# Patient Record
Sex: FEMALE | Race: WHITE
[De-identification: names, ages, dates, MRNs, and addresses within clinical notes are randomized per-mention and may not be internally consistent; named-entity substitution may affect disease eponyms.]

---

## 2021-09-27 ENCOUNTER — HOSPITAL ENCOUNTER (OUTPATIENT)
Dept: HOSPITAL 75 - CSD | Age: 61
Setting detail: OBSERVATION
LOS: 2 days | Discharge: HOME | End: 2021-09-29
Attending: INTERNAL MEDICINE | Admitting: INTERNAL MEDICINE
Payer: COMMERCIAL

## 2021-09-27 VITALS — DIASTOLIC BLOOD PRESSURE: 68 MMHG | SYSTOLIC BLOOD PRESSURE: 131 MMHG

## 2021-09-27 VITALS — HEIGHT: 65.75 IN | BODY MASS INDEX: 47.65 KG/M2 | WEIGHT: 293 LBS

## 2021-09-27 VITALS — DIASTOLIC BLOOD PRESSURE: 88 MMHG | SYSTOLIC BLOOD PRESSURE: 132 MMHG

## 2021-09-27 VITALS — DIASTOLIC BLOOD PRESSURE: 83 MMHG | SYSTOLIC BLOOD PRESSURE: 138 MMHG

## 2021-09-27 VITALS — SYSTOLIC BLOOD PRESSURE: 102 MMHG | DIASTOLIC BLOOD PRESSURE: 62 MMHG

## 2021-09-27 VITALS — DIASTOLIC BLOOD PRESSURE: 75 MMHG | SYSTOLIC BLOOD PRESSURE: 108 MMHG

## 2021-09-27 DIAGNOSIS — I25.110: ICD-10-CM

## 2021-09-27 DIAGNOSIS — I21.4: ICD-10-CM

## 2021-09-27 DIAGNOSIS — Z79.1: ICD-10-CM

## 2021-09-27 DIAGNOSIS — Z79.890: ICD-10-CM

## 2021-09-27 DIAGNOSIS — M71.21: Primary | ICD-10-CM

## 2021-09-27 DIAGNOSIS — Z79.4: ICD-10-CM

## 2021-09-27 DIAGNOSIS — I34.0: ICD-10-CM

## 2021-09-27 DIAGNOSIS — E03.9: ICD-10-CM

## 2021-09-27 DIAGNOSIS — I25.2: ICD-10-CM

## 2021-09-27 DIAGNOSIS — E66.01: ICD-10-CM

## 2021-09-27 DIAGNOSIS — R79.1: ICD-10-CM

## 2021-09-27 DIAGNOSIS — Z79.899: ICD-10-CM

## 2021-09-27 DIAGNOSIS — I11.9: ICD-10-CM

## 2021-09-27 DIAGNOSIS — E11.8: ICD-10-CM

## 2021-09-27 DIAGNOSIS — E78.2: ICD-10-CM

## 2021-09-27 DIAGNOSIS — M79.89: ICD-10-CM

## 2021-09-27 DIAGNOSIS — I87.8: ICD-10-CM

## 2021-09-27 LAB
BUN/CREAT SERPL: 17
CALCIUM SERPL-MCNC: 8.7 MG/DL (ref 8.5–10.1)
CHLORIDE SERPL-SCNC: 104 MMOL/L (ref 98–107)
CO2 SERPL-SCNC: 24 MMOL/L (ref 21–32)
CREAT SERPL-MCNC: 0.84 MG/DL (ref 0.6–1.3)
GFR SERPLBLD BASED ON 1.73 SQ M-ARVRAT: 68 ML/MIN
GLUCOSE SERPL-MCNC: 102 MG/DL (ref 70–105)
POTASSIUM SERPL-SCNC: 4.3 MMOL/L (ref 3.6–5)
SODIUM SERPL-SCNC: 138 MMOL/L (ref 135–145)

## 2021-09-27 PROCEDURE — 82947 ASSAY GLUCOSE BLOOD QUANT: CPT

## 2021-09-27 PROCEDURE — 84484 ASSAY OF TROPONIN QUANT: CPT

## 2021-09-27 PROCEDURE — 93005 ELECTROCARDIOGRAM TRACING: CPT

## 2021-09-27 PROCEDURE — 93306 TTE W/DOPPLER COMPLETE: CPT

## 2021-09-27 PROCEDURE — 85027 COMPLETE CBC AUTOMATED: CPT

## 2021-09-27 PROCEDURE — 85025 COMPLETE CBC W/AUTO DIFF WBC: CPT

## 2021-09-27 PROCEDURE — 80061 LIPID PANEL: CPT

## 2021-09-27 PROCEDURE — 36415 COLL VENOUS BLD VENIPUNCTURE: CPT

## 2021-09-27 PROCEDURE — 93458 L HRT ARTERY/VENTRICLE ANGIO: CPT

## 2021-09-27 PROCEDURE — 85347 COAGULATION TIME ACTIVATED: CPT

## 2021-09-27 PROCEDURE — 80048 BASIC METABOLIC PNL TOTAL CA: CPT

## 2021-09-27 PROCEDURE — 71275 CT ANGIOGRAPHY CHEST: CPT

## 2021-09-27 RX ADMIN — INSULIN ASPART SCH UNIT: 100 INJECTION, SOLUTION INTRAVENOUS; SUBCUTANEOUS at 20:28

## 2021-09-27 RX ADMIN — INSULIN ASPART SCH UNIT: 100 INJECTION, SOLUTION INTRAVENOUS; SUBCUTANEOUS at 18:05

## 2021-09-27 RX ADMIN — ENOXAPARIN SODIUM SCH MG: 300 INJECTION INTRAVENOUS; SUBCUTANEOUS at 20:58

## 2021-09-27 RX ADMIN — DOCUSATE SODIUM SCH MG: 100 CAPSULE ORAL at 20:12

## 2021-09-27 RX ADMIN — SENNOSIDES SCH MG: 8.6 TABLET, FILM COATED ORAL at 20:12

## 2021-09-27 NOTE — DIAGNOSTIC IMAGING REPORT
PROCEDURE: CT angiography of the chest with contrast.



TECHNIQUE: Multiple contiguous axial images were obtained through

the chest after uneventful bolus administration of intravenous

contrast. 3D reconstructed CTA MIP acquisitions were also

performed. Auto Exposure Controls were utilized during the CT

exam to meet ALARA standards for radiation dose reduction.



INDICATION: Elevated D-dimer. Abnormal EKG. History of uterine

cancer.



COMPARISON: None.



FINDINGS: There is no acute pulmonary embolus to the first

subsegmental division of the pulmonary arteries. Pulmonary

arterial trunk measures 3.4 cm in diameter.



There is advanced calcified coronary atherosclerosis. Mild

scattered calcified aortic atherosclerosis is also noted.

Thoracic aorta is suboptimally opacified, but by NASCET criteria,

there is no focal significant stenosis. Additionally, there is no

evidence of dissection or aneurysm.



Heart size is within normal limits. There is a small pericardial

effusion, but this is likely within physiologic limits. Bulky

calcifications of the mitral valve are also noted. No

pathologically enlarged or morphologically abnormal adenopathy is

seen within the mediastinum, lexii, nor axillae.



Evaluation of the lung fields demonstrates no focal

consolidation, large effusion, nor pneumothorax. No suspicious

pulmonary nodule or mass is seen.



Osseous structures show age-related degenerative changes. No

acute abnormality is seen. Included portions of the upper abdomen

show reflux of contrast into the IVC and hepatic veins.



IMPRESSION:

1. No acute pulmonary embolus to the first subsegmental division

of the pulmonary arteries.

2. Advanced calcified coronary atherosclerosis.

3. Mild prominence of the main pulmonary arterial trunk. Findings

can be seen with underlying pulmonary arterial hypertension.

4. Reflux of contrast into the hepatic veins and IVC. This does

raise suspicion for elevated right heart pressure, potentially

related to underlying pulmonary arterial hypertension. Other

right heart dysfunction may result in these findings as well.



Dictated by: 



  Dictated on workstation # [id number]

## 2021-09-27 NOTE — CONSULTATION-CARDIOLOGY
HPI-Cardiology


Cardiology Consultation:


Date of Consultation


09/27/2021


Date of Admission


09/27/2021


Attending Physician


Charley Sylvester MD


Admitting Physician


No,Local Physician


Consulting Physician


LISA MONGE JR, MD





HPI:


Time Seen by a Provider:  17:10


Chief Complaint:


Reason for consultation: Abnormal ECG.


I had the pleasure of seeing Mine on the cardiac stepdown unit at [facility] in [place], [place] this afternoon.  She has no known history of coronary 

artery disease but has cardiac risk factors of hypertension, hyperlipidemia, and

type 2 diabetes mellitus.  About 2 weeks ago she started developing pain in her 

right knee associated with some mild swelling of the right lower extremity.  She

did not seek medical attention.  Then, she had the misfortune that her mother 

passed away last week.  She then drove 11 hours from [place], [place] to [place], [place].  Her right lower extremity pain and swelling got somewhat worse.  She still 

did not seek immediate medical attention.  After everything was finished with 

the service for her mother, her sister urged her to go to the emergency room at 

[facility].  While she was there she had blood work and an 

electrocardiogram.  She also had a right lower extremity ultrasound that showed 

a Baker's cyst.  Her cardiac enzyme levels were negative.  She did have an 

elevated D-dimer but did not undergo CT angiogram of the chest.  She also had an

electrocardiogram that showed borderline inferior ST elevation.  I was contacted

by the nurse practitioner from that facility.  I recommended transfer to our 

facility in the event that she could have an impending myocardial infarction.  

She denies any chest discomfort, dyspnea, paroxysmal nocturnal dyspnea, 

orthopnea, palpitations, lightheadedness, or syncope.





Certain portions of this document may have been dictated utilizing voice 

recognition technology.  Inherent to this technology, typographical and 

grammatical errors may exist.  As much as I am diligent to identify and correct 

these mistakes, some errors may remain in the document.





Review of Systems-Cardiology


Review of Systems


Other comments


Review of 10 organ systems is as per the history of present illness, otherwise 

negative.





PMH-Social-Family Hx


Patient Social History


Smoking Status:  Never a Smoker


Have you traveled recently?:  No


Alcohol Use?:  Yes


Pt feels they are or have been:  No





Immunizations Up To Date


Date of Influenza Vaccine:  Oct 1, 2011





Past Medical History


PMH


As described under Assessment.





Family Medical History


Family Medical History:  


Her father had a myocardial infarction in his late 50s.





Allergies and Home Medications


Allergies


Uncoded Allergies:  


     PENICILLIN (Allergy, Unknown, 7/16/12)





Patient Home Medication List


Home Medication List Reviewed:  Yes


Insulin Determir (Levemir Pen) 100 U/Ml  Insuln.pen, 90 UNITS SQ HS, (Reported)


   Entered as Reported by: MIRIAM CONNOLLY on 7/13/12 0924


Insuln Asp Prt/Insulin Aspart (Novolog 70/30 Mix) 10 Units/0.1 Ml Susp, 12-15 

UNITS SC TID, (Reported)


   Entered as Reported by: MIRIAM CONNOLLY on 7/13/12 0924


Levothyroxine Sodium (Levothyroxine 150 Mcg Tab) 150 Mcg Tablet, 300 MCG PO 

DAILY, (Reported)


   Entered as Reported by: MIRIAM CONNOLLY on 7/13/12 0924


Lisinopril (Prinivil) 40 Mg Tablet, 40 MG PO DAILY, (Reported)


   Entered as Reported by: MIRIAM CONNOLLY on 7/13/12 0924


Pravastatin Sodium (Pravastatin Sodium) 80 Mg Tablet, 20 MG PO HS, (Reported)


   Entered as Reported by: MIRIAM CONNOLLY on 7/13/12 0924


Triamterene/Hydrochlorothiazid (Triamterene-Hctz 37.5-25 Mg Cp) 1 Each Capsule, 

1 EACH PO DAILY, (Reported)


   Entered as Reported by: MIRIAM CONNOLLY on 7/13/12 0924





Exam


Vital Signs





Vital Signs








  Date Time  Temp Pulse Resp B/P (MAP) Pulse Ox O2 Delivery O2 Flow Rate FiO2


 


9/27/21 15:00      Room Air  


 


9/27/21 14:52  86      








Physical Exam


General: Alert. No acute distress. Well nourished and appears stated age.  She 

is obese.


Eye: Extraocular movements are intact. Conjunctivae are clear. There are no 

xanthelasma.


HENT: Normocephalic. Atraumatic. Carotid pulsations 2/2 without bruits.


Neck: Jugular venous pressure does not appear elevated. No thyromegaly 

appreciated.


Respiratory: Lungs are clear to auscultation. Respirations are non-labored. 

Breath sounds are equal. Symmetrical chest wall expansion.


Cardiovascular: Normal rate. Regular rhythm. No murmur. No gallop. Point of 

maximal impulse is not appear displaced. Good pulses equal in all extremities. 

No edema.


Gastrointestinal: Soft. Normal bowel sounds.


Skin: Skin turgor is normal. There is no pallor.


Musculoskeletal: No kyphosis or scoliosis appreciated.  Right lower extremity is

red below the knee and warm compared to the left lower extremity.


Neurologic: Alert and oriented to person, place, time. Cranial nerves 3-12 

appear grossly intact. The patient has good motor tone strength in the upper and

lower extremities bilaterally.


Psychiatric: Cooperative. Appropriate mood & affect.


Labs





Laboratory Tests








Test


 9/27/21


16:35 Range/Units


 


 


Sodium Level 138  135-145  MMOL/L


 


Potassium Level 4.3  3.6-5.0  MMOL/L


 


Chloride Level 104    MMOL/L


 


Carbon Dioxide Level 24  21-32  MMOL/L


 


Anion Gap 10  5-14  MMOL/L


 


Blood Urea Nitrogen 14  7-18  MG/DL


 


Creatinine


 0.84 


 0.60-1.30


MG/DL


 


Estimat Glomerular Filtration


Rate 68 


  





 


BUN/Creatinine Ratio 17   


 


Glucose Level 102    MG/DL


 


Calcium Level 8.7  8.5-10.1  MG/DL


 


Troponin I 0.088 H <0.028  NG/ML











ECG Impression


ECG


Comment


Sinus rhythm with inferior Q waves and borderline inferolateral ST elevation, 

does not meet criteria for a STEMI.





Diagnosis/Problems


Diagnosis/Problems





(1) Abnormal ECG


Assessment & Plan:  Her electrocardiogram is suspicious for either an impending 

inferior and possibly lateral ST elevation myocardial infarction versus a recent

myocardial infarction.  However, she is having absolutely no chest pain or 

dyspnea or any other atypical symptoms for myocardial ischemia.  Unfortunately, 

we do not have any old electrocardiograms for comparison.  Her troponin level in

our facility is ever so slightly elevated.  She also had an elevated D-dimer 

level at the outside hospital.  She received aspirin in the outside hospital and

I will continue this here.  She was also given therapeutic dose of Lovenox at 

the outside hospital which we will continue here.  I will obtain follow-up 

troponin level in approximately 4 hours.  If the troponin level continues to 

rise, she may need urgent cardiac catheterization at that time.





(2) Troponin level elevated


Assessment & Plan:  As above, she has borderline elevation of the troponin level

but absolutely no ischemic symptoms.  A pulmonary embolism can also cause 

elevation of the troponin.  In light of the elevated D-dimer level, I have 

ordered a CT angiogram of the chest.





(3) D-dimer, elevated


Assessment & Plan:  Although her right lower extremity venous Doppler did not 

show deep venous thrombosis, this does not exclude pulmonary embolism.  As 

above, in light of the elevated D-dimer level at the outside hospital, I 

recommend a CT angiogram of the chest.





(4) Primary hypertension


Assessment & Plan:  Resume outpatient antihypertensive medication.





(5) Mixed hyperlipidemia


Assessment & Plan:  Continue her outpatient statin medication.





(6) Morbid obesity


Assessment & Plan:  She needs to work on weight loss.














LISA MONGE JR, MD         Sep 27, 2021 17:14

## 2021-09-27 NOTE — HISTORY & PHYSICAL-HOSPITALIST
History of Present Illness


HPI/Chief Complaint


Mine Cloud is a 61 year old female with PMH HTN, T2DM, HLD, morbid obesity, 

who presented with right leg pain. The pain is located behind her right knee. 

She has not noticed any rash. She denies fevers and chills. She has been short 

of breath. She says she is not very active. She denies chest pain. She had a 

recent hospitalization in [place] for gastroenteritis. She is not having any

abdominal pain or diarrhea at this time. She denies nausea and vomiting.


Source:  patient


Exam Limitations:  no limitations


Date Seen


9/27/21


Time Seen by a Provider:  16:55


Attending Physician


Charley Martinez MD


PCP


No,Local Physician


Referring Physician





Date of Admission


Sep 27, 2021 at 14:45





Home Medications & Allergies


Home Medications


Reviewed patient Home Medication Reconciliation performed by pharmacy medication

reconciliations technician and/or nursing.


Patients Allergies have been reviewed.





Allergies





Allergies


Uncoded Allergies


  PENICILLIN ( Allergy, Unknown, 7/16/12)








Past Medical-Social-Family Hx


Patient Social History


Tobacco Use?:  No


Smoking Status:  Never a Smoker


Use of E-Cig and/or Vaping dev:  No


Substance use?:  No


Alcohol Use?:  Yes


Alcohol type:  Beer, Wine


Alcohol Frequency:  Once in a while


Pt feels they are or have been:  No





Immunizations Up To Date


Date of Influenza Vaccine:  Oct 1, 2011





Current Status


Advance Directives:  No


Communicates:  Verbally


Primary Language:  English


Preferred Spoken Language:  English


Is interpretation needed?:  No


Sensory deficits:  Vision impairment


Implanted or Applied [employer] D:  None





Past Medical History


Hypertension


Diabetes, Non-Insulin dep





Family Medical History


No Pertinent Family Hx





Review of Systems


Constitutional:  no symptoms reported


EENTM:  no symptoms reported


Respiratory:  no symptoms reported


Cardiovascular:  no symptoms reported


Gastrointestinal:  no symptoms reported


Genitourinary:  no symptoms reported


Musculoskeletal:  no symptoms reported


Skin:  other (right leg pain and swelling)


Psychiatric/Neurological:  No Symptoms Reported





Physical Exam


Physical Exam


Vital Signs





Vital Signs - First Documented








 9/27/21 9/27/21





 14:52 15:00


 


Pulse 86 


 


O2 Delivery  Room Air





Capillary Refill :


Height, Weight, BMI


Height: '"


Weight: lbs. oz. kg; 49.12 BMI


Method:


General Appearance:  No Apparent Distress, Obese


HEENT:  PERRL/EOMI, Pharynx Normal


Neck:  Normal Inspection, Supple


Respiratory:  Lungs Clear, Normal Breath Sounds, No Respiratory Distress


Cardiovascular:  Regular Rate, Rhythm, No Murmur


Gastrointestinal:  Normal Bowel Sounds, Non Tender, Soft


Extremity:  No Calf Tenderness; Pedal Edema, Swelling (right leg), Other (right 

Baker cyst tender)


Neurologic/Psychiatric:  Alert, Oriented x3, No Motor/Sensory Deficits, Normal 

Mood/Affect


Skin:  Normal Color, Other (right shin warmth)


Lymphatic:  No Adenopathy





Results


Results/Procedures


Labs


Laboratory Tests


9/27/21 16:35








Patient resulted labs reviewed.


Imaging:  Reviewed Imaging Report





Assessment/Plan


Admission Diagnosis


NSTEMI


Admission Status:  Observation





Assessment and Plan


NSTEMI


   EKG concerning for acute/subacute infarct


   Troponin mildly elevated


   Cardiology consulted, appreciate assistance


   Aspirin


   Therapeutic Lovenox


   Considering left heart catheterization





Right leg swelling


   No evidence for cellulitis


   Blair ultrasound negative for DVT


   CT PE ordered


   Therapeutic Lovenox





T2DM


   Sliding scale insulin





HTN


   Hold home meds


   Monitor BP and resume as needed





Morbid obesity


   Clinically significant, no acute management needs





Diagnosis/Problems


Diagnosis/Problems





(1) NSTEMI (non-ST elevation myocardial infarction)


Status:  Acute


(2) Right leg swelling


Status:  Acute


(3) T2DM (type 2 diabetes mellitus)


Status:  Chronic


Qualifiers:  


   Diabetes mellitus long term insulin use:  without long term use  Diabetes 

mellitus complication status:  without complication  Qualified Codes:  E11.9 - 

Type 2 diabetes mellitus without complications


(4) Morbid obesity


Status:  Chronic











CHARLEY MARTINEZ MD              Sep 27, 2021 17:33

## 2021-09-28 VITALS — DIASTOLIC BLOOD PRESSURE: 67 MMHG | SYSTOLIC BLOOD PRESSURE: 106 MMHG

## 2021-09-28 VITALS — DIASTOLIC BLOOD PRESSURE: 66 MMHG | SYSTOLIC BLOOD PRESSURE: 110 MMHG

## 2021-09-28 VITALS — SYSTOLIC BLOOD PRESSURE: 107 MMHG | DIASTOLIC BLOOD PRESSURE: 67 MMHG

## 2021-09-28 VITALS — SYSTOLIC BLOOD PRESSURE: 117 MMHG | DIASTOLIC BLOOD PRESSURE: 77 MMHG

## 2021-09-28 VITALS — DIASTOLIC BLOOD PRESSURE: 63 MMHG | SYSTOLIC BLOOD PRESSURE: 94 MMHG

## 2021-09-28 VITALS — SYSTOLIC BLOOD PRESSURE: 115 MMHG | DIASTOLIC BLOOD PRESSURE: 61 MMHG

## 2021-09-28 LAB
BASOPHILS # BLD AUTO: 0.1 10^3/UL (ref 0–0.1)
BASOPHILS NFR BLD AUTO: 2 % (ref 0–10)
BUN/CREAT SERPL: 13
CALCIUM SERPL-MCNC: 8.9 MG/DL (ref 8.5–10.1)
CHLORIDE SERPL-SCNC: 105 MMOL/L (ref 98–107)
CHOLEST SERPL-MCNC: 116 MG/DL (ref ?–200)
CO2 SERPL-SCNC: 22 MMOL/L (ref 21–32)
CREAT SERPL-MCNC: 0.91 MG/DL (ref 0.6–1.3)
EOSINOPHIL # BLD AUTO: 0.3 10^3/UL (ref 0–0.3)
EOSINOPHIL NFR BLD AUTO: 4 % (ref 0–10)
GFR SERPLBLD BASED ON 1.73 SQ M-ARVRAT: 62 ML/MIN
GLUCOSE SERPL-MCNC: 184 MG/DL (ref 70–105)
HCT VFR BLD CALC: 37 % (ref 35–52)
HDLC SERPL-MCNC: 36 MG/DL (ref 40–60)
HGB BLD-MCNC: 11.3 G/DL (ref 11.5–16)
LYMPHOCYTES # BLD AUTO: 2.6 10^3/UL (ref 1–4)
LYMPHOCYTES NFR BLD AUTO: 34 % (ref 12–44)
MANUAL DIFFERENTIAL PERFORMED BLD QL: NO
MCH RBC QN AUTO: 28 PG (ref 25–34)
MCHC RBC AUTO-ENTMCNC: 31 G/DL (ref 32–36)
MCV RBC AUTO: 93 FL (ref 80–99)
MONOCYTES # BLD AUTO: 0.7 10^3/UL (ref 0–1)
MONOCYTES NFR BLD AUTO: 9 % (ref 0–12)
NEUTROPHILS # BLD AUTO: 3.9 10^3/UL (ref 1.8–7.8)
NEUTROPHILS NFR BLD AUTO: 52 % (ref 42–75)
PLATELET # BLD: 261 10^3/UL (ref 130–400)
PMV BLD AUTO: 10.8 FL (ref 9–12.2)
POTASSIUM SERPL-SCNC: 4.4 MMOL/L (ref 3.6–5)
SODIUM SERPL-SCNC: 139 MMOL/L (ref 135–145)
TRIGL SERPL-MCNC: 192 MG/DL (ref ?–150)
VLDLC SERPL CALC-MCNC: 38 MG/DL (ref 5–40)
WBC # BLD AUTO: 7.6 10^3/UL (ref 4.3–11)

## 2021-09-28 RX ADMIN — DOCUSATE SODIUM SCH MG: 100 CAPSULE ORAL at 21:13

## 2021-09-28 RX ADMIN — DOCUSATE SODIUM SCH MG: 100 CAPSULE ORAL at 08:00

## 2021-09-28 RX ADMIN — ASPIRIN SCH MG: 81 TABLET ORAL at 10:22

## 2021-09-28 RX ADMIN — INSULIN ASPART SCH UNIT: 100 INJECTION, SOLUTION INTRAVENOUS; SUBCUTANEOUS at 21:13

## 2021-09-28 RX ADMIN — INSULIN ASPART SCH UNIT: 100 INJECTION, SOLUTION INTRAVENOUS; SUBCUTANEOUS at 11:00

## 2021-09-28 RX ADMIN — SENNOSIDES SCH MG: 8.6 TABLET, FILM COATED ORAL at 21:13

## 2021-09-28 RX ADMIN — SODIUM CHLORIDE SCH MLS/HR: 900 INJECTION, SOLUTION INTRAVENOUS at 19:22

## 2021-09-28 RX ADMIN — TICAGRELOR SCH MG: 90 TABLET ORAL at 21:14

## 2021-09-28 RX ADMIN — SENNOSIDES SCH MG: 8.6 TABLET, FILM COATED ORAL at 08:00

## 2021-09-28 RX ADMIN — ASPIRIN SCH MG: 81 TABLET ORAL at 08:25

## 2021-09-28 RX ADMIN — ENOXAPARIN SODIUM SCH MG: 300 INJECTION INTRAVENOUS; SUBCUTANEOUS at 08:07

## 2021-09-28 RX ADMIN — ENOXAPARIN SODIUM SCH MG: 100 INJECTION SUBCUTANEOUS at 18:21

## 2021-09-28 RX ADMIN — INSULIN ASPART SCH UNIT: 100 INJECTION, SOLUTION INTRAVENOUS; SUBCUTANEOUS at 16:00

## 2021-09-28 RX ADMIN — INSULIN ASPART SCH UNIT: 100 INJECTION, SOLUTION INTRAVENOUS; SUBCUTANEOUS at 06:14

## 2021-09-28 RX ADMIN — SODIUM CHLORIDE SCH MLS/HR: 900 INJECTION, SOLUTION INTRAVENOUS at 10:15

## 2021-09-28 NOTE — PRE-OP NOTE & CONSCIOUS SEDAT
Pre-Operative Progress Note


H&P Reviewed


The H&P was reviewed, patient examined and no changes noted.


Date H&P Reviewed:  Sep 28, 2021


Time H&P Reviewed:  08:37


Pre-Op Diagnosis:  NSTEMI.





Conscious Sedation Pre-Proced


ASA Score


2


For ASA 3 and 4: Consider anesthesia and medical clearance. Also, for patients

with a history of failed moderate sedation consider anesthesia.

















Airway 


 


Lungs 


 


Heart 


 


 ASA score


 


 ASA 1: a normal healthy patient


 


 ASA 2:  a patient with a mild systemic disease (mid diabetes, controlled 

hypertension, obesity 


 


 ASA 3:  a patient with a severe systemic disease that limits activity  (angina,

COPD, prior Myocardial infarction)


 


 ASA 4:  a patient with an incapacitating disease that is a constant threat to 

life (CHF, renal failure)


 


 ASA 5:  a moribund patient not expected to survive 24 hrs.  (ruptured aneurysm)


 


 ASA 6:  a declared brain-dead patient whose organs are being harvested.


 


 For emergent operations, add the letter E after the classification











Mallampati Classification


Grade 3





Sedation Plan


Analgesia, Amnesia, Plan communicated to team members, Discussed options with 

patient/fam, Discussed risks with patient/fam


The patient is an appropriate candidate to undergo the planned procedure, 

sedation, and anesthesia.





The patient immediately re-assessed prior to indication.











LISA MONGE JR, MD         Sep 28, 2021 08:38

## 2021-09-28 NOTE — CARDIAC CATH REPORT
CARDIAC CATHETERIZATION


DATE OF PROCEDURE: 09/28/2021





INDICATION: Non-ST elevation myocardial infarction.





HISTORY: The patient is a 61 year old female with no previously known history of

coronary artery disease.  She presented to an outside hospital with right leg 

pain.  During her evaluation, she also underwent an electrocardiogram that 

showed borderline inferior ST elevation.  Her troponin level at the outside 

hospital was negative.  However, due to the abnormal electrocardiogram, she was 

transferred to our hospital for further evaluation.  Overnight, her troponin 

levels became gradually elevated consistent with a probable non-ST elevation 

myocardial infarction.  Her electrocardiogram did not show a definitive ST 

elevation myocardial infarction.  In light of these findings, she is now 

referred for further evaluation with a cardiac catheterization.





PROCEDURES PERFORMED: 


1.  Left heart catheterization with hemodynamic measurements.


2.  Diagnostic native coronary angiography.


3.  Drug-eluting stent placement from the proximal down to the mid right 

coronary artery for an acute thrombotic occlusion.  This was the ischemia 

related vessel for the myocardial infarction.





PROCEDURE DESCRIPTION: After informed consent and in the fasting state, left 

heart catheterization was performed through the right radial artery utilizing a 

6 Georgian system by percutaneous approach.  Standard Codi catheters were 

utilized for the diagnostic portion of the procedure.  A 6 Georgian FL 4 guide 

catheter was utilized for the percutaneous coronary intervention.  All catheters

were exchanged over a guidewire.  Following the procedure, a vascular band was 

applied to the radial artery access site and the sheath was removed with good 

hemostasis.





RESULTS:





HEMODYNAMICS: The aortic pressure was 110/67 mmHg.  The left ventricular 

pressure was 127/0 mmHg at the left ventricular end-diastolic pressure of 14 

mmHg.  There was no significant pressure gradient upon pullback across the 

aortic valve.





CORONARY ANGIOGRAPHY: The coronary arteries were heavily calcified.





Left main coronary artery: Short and free of significant disease.





Left anterior descending coronary artery: There was an 80% stenosis proximally 

at the takeoff of the first small septal  and appeared to have involve

the first large diagonal branch creating a bifurcation lesion with a Antonio 

classification of 1, 1, 0 with RICKY-2 flow.  It was somewhat difficult to lay 

out this bifurcation to see the exact anatomy of the lesion in relation to the 

bifurcation.  There were left-to-right collaterals seen filling the distal right

coronary artery.





Left circumflex coronary artery: There was a 30% stenosis in the proximal 

segment of the first large obtuse marginal branch.





Right coronary artery: Dominant and totally occluded in the midsegment with RICKY

0 flow and evidence of thrombus.  This was the ischemia related vessel for the 

non-ST elevation myocardial infarction.





PERCUTANEOUS CORONARY INTERVENTION: Percutaneous coronary intervention was 

carried out on the right coronary artery through a 6 Georgian FL 4 guide catheter.

 The lesion was successfully crossed with a Whisper medium support guidewire. I 

subsequently performed coronary angioplasty with a 2 x 12 mm mini Trek balloon 

at a pressure of 10 ana for multiple inflations from the proximal down to the 

mid segment.  Flow was restored.  I subsequently attempted to advance a 3.5 x 38

mm drug-eluting Xience Monica stent into the mid segment but this would not 

advance all the way through the lesion.  The stent was removed intact.  A 6 

Georgian Guideliner support catheter was advanced over the wire.  I was then able 

to advance the 3 x 38 mm stent into the distal aspect of the lesion and this was

deployed at a pressure of 16 ana.  I subsequently deployed a 3.5 x 15 mm drug-

eluting Xience Monica stent in the proximal segment overlapping the proximal 

edge of the previously placed stent at a pressure of 16 ana.  I then postdilated

the overlap segment with the stent balloon at a pressure of 16 ana.  A follow-up

angiogram showed that the distal portion of the first stent may not have fully 

been apposed.  I then postdilated the entire segment of stented area with a 3.5 

x 15 mm noncompliant Trek balloon at a pressure of 18 ana at each inflation.  

Following stent placement, there was 0% residual stenosis with RICKY-3 flow.





IMPRESSION:


1.  Normal systemic blood pressure with mildly elevated left ventricular end-

diastolic pressure.


2.  Total thrombotic occlusion of the mid right coronary artery.  This was the 

ischemia related vessel for the non-ST elevation myocardial infarction.


3.  Status post drug-eluting stent placement to the proximal down to the mid 

right coronary artery with a 3 x 15 mm Xience Monica stent in the proximal 

segment and a 3.5 x 38 mm Xience Monica stent in the mid segment with both 

stents overlapped and postdilated with a 3.5 mm noncompliant balloon with 0% 

residual stenosis and RICKY-3 flow.


4.  There is severe residual stenosis in the proximal left anterior descending 

coronary artery that appears to form a bifurcation with the first large diagonal

branch.  This does not appear to be a critical stenosis and can most likely be 

treated at a later point in time following discharge.


5.  The patient is known to have normal left ventricular systolic function with 

an estimated ejection fraction of 55-60% by echocardiogram performed prior to 

this procedure.





Certain portions of this document may have been dictated utilizing voice 

recognition technology.  Inherent to this technology, typographical and 

grammatical errors may exist.  As much as I am diligent to identify and correct 

these mistakes, some errors may remain in the document.











LISA MONGE JR, MD         Sep 28, 2021 17:46

## 2021-09-28 NOTE — PROGRESS NOTE - HOSPITALIST
Subjective


HPI/CC On Admission


Date Seen by Provider:  Sep 28, 2021


Time Seen by Provider:  12:30


Mine Cloud is a 61 year old female with PMH HTN, T2DM, HLD, morbid obesity, 

who presented with right leg pain. The pain is located behind her right knee. 

She has not noticed any rash. She denies fevers and chills. She has been short 

of breath. She says she is not very active. She denies chest pain. She had a 

recent hospitalization in [place] for gastroenteritis. She is not having any

abdominal pain or diarrhea at this time. She denies nausea and vomiting.


Subjective/Events-last exam


She is feeling nauseous. She just had a few bites of her lunch. She had her 

heart cath this morning and had two stents placed. She denies chest pain. She 

feels a bit short of breath at the moment.





Objective


Exam


Vital Signs





Vital Signs








  Date Time  Temp Pulse Resp B/P (MAP) Pulse Ox O2 Delivery O2 Flow Rate FiO2


 


9/28/21 13:00  74      


 


9/28/21 12:00 36.7  20 117/77 (90) 97 Room Air  


 


9/28/21 08:00       5.00 





Capillary Refill : Less Than 3 Seconds


General Appearance:  No Apparent Distress, Obese


Respiratory:  Lungs Clear, Normal Breath Sounds, No Respiratory Distress


Cardiovascular:  Regular Rate, Rhythm, No Edema, No Murmur


Gastrointestinal:  Normal Bowel Sounds, Non Tender, Soft


Extremity:  Normal Inspection, Non Tender, No Pedal Edema


Skin:  Normal Color, Warm/Dry





Results/Procedures


Lab


Laboratory Tests


9/28/21 04:45








Patient resulted labs reviewed.


Imaging:  Reviewed Imaging Report





Assessment/Plan


Assessment and Plan


Assess & Plan/Chief Complaint


NSTEMI


CAD


   Cardiology consulted, appreciate assistance


   Left heart cath showed near complete RCA occlusion, two stents placed


   Aspirin


   Brilinta


   Coreg


   Lipitor





Right leg swelling


   No evidence for cellulitis


   Blair ultrasound negative for DVT


   CT negative for PE





T2DM


   Sliding scale insulin





HTN


   Coreg





Morbid obesity


   Clinically significant, no acute management needs





DVT prophylaxis: Lovenox





Diagnosis/Problems


Diagnosis/Problems





(1) NSTEMI (non-ST elevation myocardial infarction)


Status:  Acute


(2) Right leg swelling


Status:  Acute


(3) T2DM (type 2 diabetes mellitus)


Status:  Chronic


Qualifiers:  


   Diabetes mellitus long term insulin use:  with long term use  Diabetes 

mellitus complication status:  without complication  Qualified Codes:  E11.9 - 

Type 2 diabetes mellitus without complications; Z79.4 - Long term (current) use 

of insulin


(4) Morbid obesity


Status:  Chronic











MICHELLE MARTINEZ MD              Sep 28, 2021 15:13

## 2021-09-29 VITALS — SYSTOLIC BLOOD PRESSURE: 123 MMHG | DIASTOLIC BLOOD PRESSURE: 72 MMHG

## 2021-09-29 VITALS — SYSTOLIC BLOOD PRESSURE: 114 MMHG | DIASTOLIC BLOOD PRESSURE: 82 MMHG

## 2021-09-29 LAB
BASOPHILS # BLD AUTO: 0.1 10^3/UL (ref 0–0.1)
BASOPHILS NFR BLD AUTO: 1 % (ref 0–10)
BUN/CREAT SERPL: 14
CALCIUM SERPL-MCNC: 8.7 MG/DL (ref 8.5–10.1)
CHLORIDE SERPL-SCNC: 104 MMOL/L (ref 98–107)
CO2 SERPL-SCNC: 23 MMOL/L (ref 21–32)
CREAT SERPL-MCNC: 0.93 MG/DL (ref 0.6–1.3)
EOSINOPHIL # BLD AUTO: 0.3 10^3/UL (ref 0–0.3)
EOSINOPHIL NFR BLD AUTO: 4 % (ref 0–10)
GFR SERPLBLD BASED ON 1.73 SQ M-ARVRAT: 61 ML/MIN
GLUCOSE SERPL-MCNC: 139 MG/DL (ref 70–105)
HCT VFR BLD CALC: 35 % (ref 35–52)
HGB BLD-MCNC: 10.6 G/DL (ref 11.5–16)
LYMPHOCYTES # BLD AUTO: 1.8 10^3/UL (ref 1–4)
LYMPHOCYTES NFR BLD AUTO: 26 % (ref 12–44)
MANUAL DIFFERENTIAL PERFORMED BLD QL: NO
MCH RBC QN AUTO: 29 PG (ref 25–34)
MCHC RBC AUTO-ENTMCNC: 31 G/DL (ref 32–36)
MCV RBC AUTO: 94 FL (ref 80–99)
MONOCYTES # BLD AUTO: 0.7 10^3/UL (ref 0–1)
MONOCYTES NFR BLD AUTO: 9 % (ref 0–12)
NEUTROPHILS # BLD AUTO: 4.3 10^3/UL (ref 1.8–7.8)
NEUTROPHILS NFR BLD AUTO: 59 % (ref 42–75)
PLATELET # BLD: 232 10^3/UL (ref 130–400)
PMV BLD AUTO: 10.2 FL (ref 9–12.2)
POTASSIUM SERPL-SCNC: 4.5 MMOL/L (ref 3.6–5)
SODIUM SERPL-SCNC: 137 MMOL/L (ref 135–145)
WBC # BLD AUTO: 7.2 10^3/UL (ref 4.3–11)

## 2021-09-29 RX ADMIN — DOCUSATE SODIUM SCH MG: 100 CAPSULE ORAL at 09:51

## 2021-09-29 RX ADMIN — SODIUM CHLORIDE SCH MLS/HR: 900 INJECTION, SOLUTION INTRAVENOUS at 05:56

## 2021-09-29 RX ADMIN — INSULIN ASPART SCH UNIT: 100 INJECTION, SOLUTION INTRAVENOUS; SUBCUTANEOUS at 06:02

## 2021-09-29 RX ADMIN — ENOXAPARIN SODIUM SCH MG: 100 INJECTION SUBCUTANEOUS at 06:03

## 2021-09-29 RX ADMIN — SENNOSIDES SCH MG: 8.6 TABLET, FILM COATED ORAL at 09:51

## 2021-09-29 RX ADMIN — TICAGRELOR SCH MG: 90 TABLET ORAL at 09:44

## 2021-09-29 RX ADMIN — ASPIRIN SCH MG: 81 TABLET ORAL at 09:44

## 2021-09-29 NOTE — CARDIOLOGY PROGRESS NOTE
Progress Note-Cardiology


Events since last exam


Date Seen by Provider:  Sep 29, 2021


Time Seen by Provider:  09:48


Events since last exam


I am following her for NSTEMI.  Her radial access site looks good without any 

hematoma.She denies any chest discomfort.  I should note, she did not have any 

chest discomfort or dyspnea prior to admission.She denies palpitations or 

syncope.  The pain in her right knee and swelling in her right leg are much 

improved.  She would like to go home today.  She plans to drive back to her home

in [place] on Friday or Saturday.





Certain portions of this document may have been dictated utilizing voice 

recognition technology.  Inherent to this technology, typographical and 

grammatical errors may exist.  As much as I am diligent to identify and correct 

these mistakes, some errors may remain in the document.





Vitals


Last set of Vitals Signs





Vital Signs








 9/28/21 9/29/21





 21:00 08:11


 


Temp  36.0


 


Pulse  80


 


Resp  21


 


B/P (MAP)  123/72 (89)


 


Pulse Ox  96


 


O2 Delivery  Room Air


 


O2 Flow Rate 3.00 











Labs


Labs


Laboratory Tests


9/29/21 05:35














Exam


Vital Signs





Vital Signs








  Date Time  Temp Pulse Resp B/P (MAP) Pulse Ox O2 Delivery O2 Flow Rate FiO2


 


9/29/21 08:11 36.0 80 21 123/72 (89) 96 Room Air  


 


9/28/21 21:00       3.00 








Physical Exam


General: Alert. No acute distress.She is morbidly obese.


Eye: No xanthelasma.


HENT: Normocephalic.


Neck: Jugular venous pressure does not appear elevated.


Respiratory: Lungs Have scattered inspiratory wheezes. Respirations are non-

labored. Breath sounds are equal. Symmetrical chest wall expansion.


Cardiovascular: Normal rate. Regular rhythm. No murmur. No gallop. Trace 

bilateral pretibial edema.


Gastrointestinal: Soft. Normal bowel sounds.


Skin: Warm. Dry.


Neurologic: Alert and oriented to person, place, time. Cranial nerves 3-11 

grossly intact.


Psychiatric: Cooperative. Appropriate mood & affect.


Labs





Laboratory Tests








Test


 9/28/21


10:47 9/28/21


16:31 9/28/21


20:30 9/29/21


05:35 Range/Units


 


 


Glucometer 152 H 165 H 189 H    MG/DL


 


White Blood Count


 


 


 


 7.2 


 4.3-11.0


10^3/uL


 


Red Blood Count


 


 


 


 3.69 L


 3.80-5.11


10^6/uL


 


Hemoglobin    10.6 L 11.5-16.0  g/dL


 


Hematocrit    35  35-52  %


 


Mean Corpuscular Volume    94  80-99  fL


 


Mean Corpuscular Hemoglobin    29  25-34  pg


 


Mean Corpuscular Hemoglobin


Concent 


 


 


 31 L


 32-36  g/dL





 


Red Cell Distribution Width    14.5  10.0-14.5  %


 


Platelet Count


 


 


 


 232 


 130-400


10^3/uL


 


Mean Platelet Volume    10.2  9.0-12.2  fL


 


Immature Granulocyte % (Auto)    1   %


 


Neutrophils (%) (Auto)    59  42-75  %


 


Lymphocytes (%) (Auto)    26  12-44  %


 


Monocytes (%) (Auto)    9  0-12  %


 


Eosinophils (%) (Auto)    4  0-10  %


 


Basophils (%) (Auto)    1  0-10  %


 


Neutrophils # (Auto)


 


 


 


 4.3 


 1.8-7.8


10^3/uL


 


Lymphocytes # (Auto)


 


 


 


 1.8 


 1.0-4.0


10^3/uL


 


Monocytes # (Auto)


 


 


 


 0.7 


 0.0-1.0


10^3/uL


 


Eosinophils # (Auto)


 


 


 


 0.3 


 0.0-0.3


10^3/uL


 


Basophils # (Auto)


 


 


 


 0.1 


 0.0-0.1


10^3/uL


 


Immature Granulocyte # (Auto)


 


 


 


 0.0 


 0.0-0.1


10^3/uL


 


Sodium Level    137  135-145  MMOL/L


 


Potassium Level    4.5  3.6-5.0  MMOL/L


 


Chloride Level    104    MMOL/L


 


Carbon Dioxide Level    23  21-32  MMOL/L


 


Anion Gap    10  5-14  MMOL/L


 


Blood Urea Nitrogen    13  7-18  MG/DL


 


Creatinine


 


 


 


 0.93 


 0.60-1.30


MG/DL


 


Estimat Glomerular Filtration


Rate 


 


 


 61 


  





 


BUN/Creatinine Ratio    14   


 


Glucose Level    139 H   MG/DL


 


Calcium Level    8.7  8.5-10.1  MG/DL


 


Test


 9/29/21


06:01 


 


 


 Range/Units


 


 


Glucometer 142 H      MG/DL











Diagnosis/Problems


Diagnosis/Problems





(1) Non-ST elevation myocardial infarction (NSTEMI), initial care episode


Assessment & Plan:  This was due to an acute thrombotic occlusion of the mid 

right coronary artery which was treated with 2 drug-eluting stents from the 

proximal down to the mid right coronary artery with excellent angiographic 

result. She has moderate to severe residual disease in the left anterior 

descending coronary artery that appears to involve the first large diagonal 

branch.  This may need to be addressed at a later point in time.  She should 

continue on aspirin 81 mg once a day, Brilinta 90 mg twice a day, beta-blocker, 

and statin medication.  From a cardiac standpoint, she can be discharged home 

today.  I advised her to locate an interventional cardiologist close to home 

when she returns to [place], [place].





(2) Coronary artery disease with unstable angina pectoris


Assessment & Plan:  As above.





(3) D-dimer, elevated


Assessment & Plan:  She underwent a CT angiogram of the chest that did not show 

any evidence of pulmonary embolism.





(4) Primary hypertension


Assessment & Plan:  Continue beta-blocker along with outpatient antihypertensive

medication if needed.





(5) Mixed hyperlipidemia


Assessment & Plan:  Continue her outpatient statin medication.





(6) Type 2 diabetes mellitus with complication


Assessment & Plan:  She can resume Metformin tomorrow evening.This is on hold 

due to the contrast administered during the CT angiogram of the chest and the 

cardiac catheterization.





(7) Morbid obesity


Status:  Chronic


Assessment & Plan:  She needs to work on weight loss.





(8) Synovial cyst of popliteal space [Baker], right knee


Assessment & Plan:  This was most likely the cause of her right knee pain and 

swelling.  She should follow-up with her primary provider when she returns home 

to [place], [place].














LISA MONGE JR, MD         Sep 29, 2021 09:53

## 2021-09-29 NOTE — DISCHARGE SUMMARY
Discharge Summary


Hospital Course


Was the Problem List Reviewed?:  Yes


Problems/Dx:  


(1) Non-ST elevation myocardial infarction (NSTEMI), initial care episode


(2) Coronary artery disease with unstable angina pectoris


(3) D-dimer, elevated


(4) Primary hypertension


(5) Mixed hyperlipidemia


(6) Type 2 diabetes mellitus with complication


(7) Morbid obesity


Status:  Chronic


(8) Synovial cyst of popliteal space [Packer], right knee


Hospital Course


Date of Admission: Sep 27, 2021 at 14:45 


Admission Diagnosis :  NSTEMI





Family Physician/Provider: Sania,Local Physician  





Date of Discharge: 9/29/21 


Discharge Diagnosis:  NSTEMI, CAD s/p coronary artery stenting








Hospital Course:


Mine Cloud is a 61-year-old female with past medical history of hypertension, 

diabetes, hyperlipidemia, hypothyroidism, morbid obesity, who presented with leg

pain and was admitted with non-ST elevation myocardial infarction.  Cardiology 

was consulted and assisted with her care.  Her troponin was mildly elevated.  

Her EKG was concerning for ischemic changes.  She underwent a left heart 

catheterization and was found to have a near total occlusion of her right 

coronary artery and two drug-eluting stents were placed.  She was started on 

aspirin and Brilinta.  Her Lipitor was increased to high intensity dose.  She 

was started on Coreg.  Her leg pain was determined to be due to a Baker's cyst. 

Her pain and swelling improved with that.  She should follow-up with her primary

care physician in about a week.  She needs to establish care with a cardiologist

near her hometown.  She was discharged home in stable condition.  She plans to 

drive back home to [place] in a few days.














Labs and Pending Lab Test:


Laboratory Tests


9/28/21 10:47: Glucometer 152H


9/28/21 16:31: Glucometer 165H


9/28/21 20:30: Glucometer 189H


9/29/21 05:35: 


White Blood Count 7.2, Red Blood Count 3.69L, Hemoglobin 10.6L, Hematocrit 35, 

Mean Corpuscular Volume 94, Mean Corpuscular Hemoglobin 29, Mean Corpuscular 

Hemoglobin Concent 31L, Red Cell Distribution Width 14.5, Platelet Count 232, 

Mean Platelet Volume 10.2, Immature Granulocyte % (Auto) 1, Neutrophils (%) 

(Auto) 59, Lymphocytes (%) (Auto) 26, Monocytes (%) (Auto) 9, Eosinophils (%) 

(Auto) 4, Basophils (%) (Auto) 1, Neutrophils # (Auto) 4.3, Lymphocytes # (Auto)

1.8, Monocytes # (Auto) 0.7, Eosinophils # (Auto) 0.3, Basophils # (Auto) 0.1, 

Immature Granulocyte # (Auto) 0.0, Sodium Level 137, Potassium Level 4.5, 

Chloride Level 104, Carbon Dioxide Level 23, Anion Gap 10, Blood Urea Nitrogen 

13, Creatinine 0.93, Estimat Glomerular Filtration Rate 61, BUN/Creatinine Ratio

14, Glucose Level 139H, Calcium Level 8.7


9/29/21 06:01: Glucometer 142H





Home Meds


Active


Aspirin EC (Aspirin) 81 Mg Tablet.dr 81 Mg PO DAILY 30 Days


Carvedilol 3.125 Mg Tablet 3.125 Mg PO BID 30 Days


Lipitor (Atorvastatin Calcium) 40 Mg Tablet 40 Mg PO HS 30 Days


Brilinta (Ticagrelor) 90 Mg Tablet 90 Mg PO BID 30 Days


Reported


Ibuprofen 200 Mg Capsule 600 Mg PO Q8H PRN


Tylenol Extra Strength (Acetaminophen) 500 Mg Tablet 1,000 Mg PO Q8H PRN


Ventolin Hfa (Albuterol Sulfate) 18 Gm Hfa.aer.ad 2 Puff INH Q4H PRN


Triamterene-Hctz 37.5-25 mg Tb (Triamterene/Hydrochlorothiazid) 1 Each Tablet 1 

Ea PO DAILY


Ondansetron Odt (Ondansetron) 4 Mg Tab.rapdis 4 Mg PO Q8H PRN


Pantoprazole Sodium 40 Mg Tablet.dr 40 Mg PO HS


Glyburide 5 Mg Tablet 5 Mg PO BID


Calcium + Vitamin D Tablet (Calcium Carbonate/Vitamin D3) 1 Each Tablet 1 Each 

PO DAILY


Multivitamin 1 Each Tablet 1 Each PO DAILY


Fish Oil Pearls Softgel (Omega-3 Fatty Acids/Fish Oil) 1 Each Capsule 1 Each PO 

HS


Vitamin C (Ascorbic Acid) 1,000 Mg Tablet 1,000 Mg PO DAILY


Gabapentin 400 Mg Capsule 800 Mg PO BID


     TAKES 2 (400MG) TABS


Synthroid (Levothyroxine Sodium) 25 Mcg Tablet 25 Mcg PO DAILY


     TAKES 200MCG +25MCG TOGETHER TO EQUAL 225MCG


Synthroid (Levothyroxine Sodium) 200 Mcg Tablet 200 Mcg PO DAILY


     TAKES 200MCG +25MCG TOGETHER TO EQUAL 225MCG


Atorvastatin Calcium 20 Mg Tablet 20 Mg PO HS


Metformin HCl 1,000 Mg Tablet 1,000 Mg PO BID


Lisinopril 2.5 Mg Tablet 2.5 Mg PO DAILY


Assessment/Pt Instructions


Take medications as prescribed.  Follow-up with your primary care physician.  

Establish care with a cardiologist close to your home.  Return with worsening 

chest pain, shortness of breath, or if you feel like you are getting worse.


Discharge Planning:  <30 minutes discharge planning





Discharge Instructions


Discharge Diet:  ADA Diet


Activity as Tolerated:  Yes


Consultations


Cardiology





Discharge Physical Examination


Vital Signs





Vital Signs








  Date Time  Temp Pulse Resp B/P (MAP) Pulse Ox O2 Delivery O2 Flow Rate FiO2


 


9/29/21 08:11 36.0 80 21 123/72 (89) 96 Room Air  


 


9/28/21 21:00       3.00 








General Appearance:  No Apparent Distress, Obese


Respiratory:  Lungs Clear, Normal Breath Sounds, No Respiratory Distress


Cardiovascular:  Regular Rate, Rhythm, No Edema, No Murmur


Gastrointestinal:  Normal Bowel Sounds, Non Tender, Soft


Extremity:  Normal Inspection, Non Tender, No Pedal Edema


Skin:  Normal Color, Warm/Dry


Neurologic/Psychiatric:  Alert, Oriented x3, No Motor/Sensory Deficits, Normal 

Mood/Affect


Allergies:  


Coded Allergies:  


     Penicillins (Verified  Allergy, Mild, 9/28/21)





Discharge Summary


Date of Admission


Sep 27, 2021 at 14:45


Date of Discharge





Discharge Date:  Sep 29, 2021


Discharge Time:  10:09


Admission Diagnosis


NSTEMI


Consults/Procedures


Consulations


Cardiology


Procedures


Left heart catheterization and coronary artery stenting





Discharge Diagnosis


NSTEMI


CAD





(1) Non-ST elevation myocardial infarction (NSTEMI), initial care episode


(2) Coronary artery disease with unstable angina pectoris


(3) D-dimer, elevated


(4) Primary hypertension


(5) Mixed hyperlipidemia


(6) Type 2 diabetes mellitus with complication


(7) Morbid obesity


Status:  Chronic


(8) Synovial cyst of popliteal space [Packer], right knee











MICHELLE MARTINEZ MD              Sep 29, 2021 10:09